# Patient Record
Sex: MALE | Race: WHITE | URBAN - METROPOLITAN AREA
[De-identification: names, ages, dates, MRNs, and addresses within clinical notes are randomized per-mention and may not be internally consistent; named-entity substitution may affect disease eponyms.]

---

## 2023-01-23 ENCOUNTER — OFFICE (OUTPATIENT)
Dept: URBAN - METROPOLITAN AREA CLINIC 104 | Facility: CLINIC | Age: 88
Setting detail: OPHTHALMOLOGY
End: 2023-01-23
Payer: MEDICARE

## 2023-01-23 DIAGNOSIS — H01.005: ICD-10-CM

## 2023-01-23 DIAGNOSIS — Z96.1: ICD-10-CM

## 2023-01-23 DIAGNOSIS — H02.135: ICD-10-CM

## 2023-01-23 DIAGNOSIS — H40.1132: ICD-10-CM

## 2023-01-23 DIAGNOSIS — H01.004: ICD-10-CM

## 2023-01-23 DIAGNOSIS — H35.3132: ICD-10-CM

## 2023-01-23 DIAGNOSIS — H01.001: ICD-10-CM

## 2023-01-23 DIAGNOSIS — H01.002: ICD-10-CM

## 2023-01-23 DIAGNOSIS — H43.813: ICD-10-CM

## 2023-01-23 DIAGNOSIS — H26.493: ICD-10-CM

## 2023-01-23 PROCEDURE — 99213 OFFICE O/P EST LOW 20 MIN: CPT | Performed by: OPHTHALMOLOGY

## 2023-01-23 ASSESSMENT — REFRACTION_AUTOREFRACTION
OS_SPHERE: +0.75
OD_CYLINDER: -2.25
OD_AXIS: 82
OS_CYLINDER: -1.75
OD_SPHERE: +1.50
OS_AXIS: 89

## 2023-01-23 ASSESSMENT — SPHEQUIV_DERIVED
OD_SPHEQUIV: 0.375
OS_SPHEQUIV: -0.125

## 2023-01-23 ASSESSMENT — KERATOMETRY
OD_AXISANGLE_DEGREES: 174
OD_K2POWER_DIOPTERS: 45.42
OD_K1POWER_DIOPTERS: 43.66
OS_K1POWER_DIOPTERS: 44.12
OS_AXISANGLE_DEGREES: 11
OS_K2POWER_DIOPTERS: 45.55

## 2023-01-23 ASSESSMENT — LID EXAM ASSESSMENTS
OD_BLEPHARITIS: RLL RUL 2+ 3+
OS_BLEPHARITIS: LLL LUL 2+ 3+

## 2023-01-23 ASSESSMENT — AXIALLENGTH_DERIVED
OS_AL: 23.16
OD_AL: 23.08

## 2023-01-23 ASSESSMENT — PACHYMETRY
OS_CT_CORRECTION: 0
OS_CT_UM: 540
OD_CT_CORRECTION: 0
OD_CT_UM: 540

## 2023-01-23 ASSESSMENT — VISUAL ACUITY
OS_BCVA: 20/50
OD_BCVA: 20/50

## 2023-01-23 ASSESSMENT — LID POSITION - ECTROPION: OS_ECTROPION: LLL 2+

## 2023-01-23 ASSESSMENT — CONFRONTATIONAL VISUAL FIELD TEST (CVF)
OS_FINDINGS: FULL
OD_FINDINGS: FULL

## 2023-01-23 ASSESSMENT — TONOMETRY
OS_IOP_MMHG: 11
OD_IOP_MMHG: 10

## 2023-07-17 ENCOUNTER — OFFICE (OUTPATIENT)
Dept: URBAN - METROPOLITAN AREA CLINIC 104 | Facility: CLINIC | Age: 88
Setting detail: OPHTHALMOLOGY
End: 2023-07-17
Payer: MEDICARE

## 2023-07-17 DIAGNOSIS — H35.3132: ICD-10-CM

## 2023-07-17 DIAGNOSIS — H02.135: ICD-10-CM

## 2023-07-17 DIAGNOSIS — H40.1132: ICD-10-CM

## 2023-07-17 DIAGNOSIS — H01.002: ICD-10-CM

## 2023-07-17 DIAGNOSIS — H01.001: ICD-10-CM

## 2023-07-17 DIAGNOSIS — H01.004: ICD-10-CM

## 2023-07-17 DIAGNOSIS — H01.005: ICD-10-CM

## 2023-07-17 DIAGNOSIS — H26.493: ICD-10-CM

## 2023-07-17 DIAGNOSIS — H43.813: ICD-10-CM

## 2023-07-17 DIAGNOSIS — Z96.1: ICD-10-CM

## 2023-07-17 PROCEDURE — 99213 OFFICE O/P EST LOW 20 MIN: CPT | Performed by: OPHTHALMOLOGY

## 2023-07-17 ASSESSMENT — VISUAL ACUITY
OS_BCVA: 20/40
OD_BCVA: 20/40

## 2023-07-17 ASSESSMENT — PACHYMETRY
OD_CT_CORRECTION: 0
OD_CT_UM: 540
OS_CT_CORRECTION: 0
OS_CT_UM: 540

## 2023-07-17 ASSESSMENT — LID POSITION - ECTROPION: OS_ECTROPION: LLL 2+

## 2023-07-17 ASSESSMENT — LID EXAM ASSESSMENTS
OS_BLEPHARITIS: LLL LUL 2+ 3+
OD_BLEPHARITIS: RLL RUL 2+ 3+

## 2023-07-17 ASSESSMENT — CONFRONTATIONAL VISUAL FIELD TEST (CVF)
OD_FINDINGS: FULL
OS_FINDINGS: FULL

## 2024-01-22 ENCOUNTER — OFFICE (OUTPATIENT)
Dept: URBAN - METROPOLITAN AREA CLINIC 104 | Facility: CLINIC | Age: 89
Setting detail: OPHTHALMOLOGY
End: 2024-01-22
Payer: MEDICARE

## 2024-01-22 DIAGNOSIS — H01.004: ICD-10-CM

## 2024-01-22 DIAGNOSIS — H35.3132: ICD-10-CM

## 2024-01-22 DIAGNOSIS — Z96.1: ICD-10-CM

## 2024-01-22 DIAGNOSIS — H26.493: ICD-10-CM

## 2024-01-22 DIAGNOSIS — H43.813: ICD-10-CM

## 2024-01-22 DIAGNOSIS — H52.4: ICD-10-CM

## 2024-01-22 DIAGNOSIS — H01.001: ICD-10-CM

## 2024-01-22 DIAGNOSIS — H40.1132: ICD-10-CM

## 2024-01-22 DIAGNOSIS — H02.135: ICD-10-CM

## 2024-01-22 DIAGNOSIS — H01.002: ICD-10-CM

## 2024-01-22 DIAGNOSIS — H01.005: ICD-10-CM

## 2024-01-22 PROCEDURE — 99213 OFFICE O/P EST LOW 20 MIN: CPT | Performed by: OPHTHALMOLOGY

## 2024-01-22 PROCEDURE — 92133 CPTRZD OPH DX IMG PST SGM ON: CPT | Performed by: OPHTHALMOLOGY

## 2024-01-22 PROCEDURE — 92015 DETERMINE REFRACTIVE STATE: CPT | Performed by: OPHTHALMOLOGY

## 2024-01-22 ASSESSMENT — REFRACTION_MANIFEST
OS_VA1: 20/30-
OS_VA2: 20/30
OD_VA2: 20/30
OS_CYLINDER: -1.25
OD_SPHERE: +0.50
OS_SPHERE: PLANO
OD_ADD: +2.50
OS_ADD: +2.50
OD_AXIS: 090
OD_VA1: 20/30
OD_CYLINDER: -1.00
OS_AXIS: 090

## 2024-01-22 ASSESSMENT — REFRACTION_AUTOREFRACTION
OD_CYLINDER: -2.75
OS_SPHERE: +0.75
OS_CYLINDER: -2.25
OD_SPHERE: +1.50
OD_AXIS: 088
OS_AXIS: 088

## 2024-01-22 ASSESSMENT — CONFRONTATIONAL VISUAL FIELD TEST (CVF)
OD_FINDINGS: FULL
OS_FINDINGS: FULL

## 2024-01-22 ASSESSMENT — LID POSITION - ECTROPION: OS_ECTROPION: LLL 2+

## 2024-01-22 ASSESSMENT — SPHEQUIV_DERIVED
OD_SPHEQUIV: 0
OS_SPHEQUIV: -0.375
OD_SPHEQUIV: 0.125

## 2024-01-22 ASSESSMENT — LID EXAM ASSESSMENTS
OS_BLEPHARITIS: LLL LUL 2+ 3+
OD_BLEPHARITIS: RLL RUL 2+ 3+

## 2024-01-29 ENCOUNTER — EMERGENCY (EMERGENCY)
Facility: HOSPITAL | Age: 89
LOS: 1 days | Discharge: HOME | End: 2024-01-29
Attending: EMERGENCY MEDICINE
Payer: MEDICARE

## 2024-01-29 VITALS
OXYGEN SATURATION: 93 % | RESPIRATION RATE: 16 BRPM | SYSTOLIC BLOOD PRESSURE: 165 MMHG | TEMPERATURE: 98 F | HEART RATE: 62 BPM | DIASTOLIC BLOOD PRESSURE: 77 MMHG

## 2024-01-29 VITALS
OXYGEN SATURATION: 92 % | HEART RATE: 61 BPM | TEMPERATURE: 98 F | SYSTOLIC BLOOD PRESSURE: 170 MMHG | HEIGHT: 69 IN | RESPIRATION RATE: 20 BRPM | WEIGHT: 154.98 LBS | DIASTOLIC BLOOD PRESSURE: 70 MMHG

## 2024-01-29 PROCEDURE — 72125 CT NECK SPINE W/O DYE: CPT | Mod: 26,MA

## 2024-01-29 PROCEDURE — 70450 CT HEAD/BRAIN W/O DYE: CPT | Mod: 26,MA

## 2024-01-29 PROCEDURE — 93010 ELECTROCARDIOGRAM REPORT: CPT

## 2024-01-29 PROCEDURE — 72125 CT NECK SPINE W/O DYE: CPT | Mod: MA

## 2024-01-29 PROCEDURE — G1004: CPT

## 2024-01-29 PROCEDURE — 71250 CT THORAX DX C-: CPT | Mod: 26,ME

## 2024-01-29 PROCEDURE — 99284 EMERGENCY DEPT VISIT MOD MDM: CPT

## 2024-01-29 PROCEDURE — 99284 EMERGENCY DEPT VISIT MOD MDM: CPT | Mod: 25

## 2024-01-29 PROCEDURE — 74176 CT ABD & PELVIS W/O CONTRAST: CPT | Mod: ME

## 2024-01-29 PROCEDURE — 74176 CT ABD & PELVIS W/O CONTRAST: CPT | Mod: 26,ME

## 2024-01-29 PROCEDURE — 71250 CT THORAX DX C-: CPT | Mod: ME

## 2024-01-29 PROCEDURE — 70450 CT HEAD/BRAIN W/O DYE: CPT | Mod: MA

## 2024-01-29 PROCEDURE — 93005 ELECTROCARDIOGRAM TRACING: CPT

## 2024-01-29 RX ORDER — METHOCARBAMOL 500 MG/1
1000 TABLET, FILM COATED ORAL ONCE
Refills: 0 | Status: COMPLETED | OUTPATIENT
Start: 2024-01-29 | End: 2024-01-29

## 2024-01-29 RX ORDER — LIDOCAINE 4 G/100G
1 CREAM TOPICAL ONCE
Refills: 0 | Status: COMPLETED | OUTPATIENT
Start: 2024-01-29 | End: 2024-01-29

## 2024-01-29 RX ADMIN — LIDOCAINE 1 PATCH: 4 CREAM TOPICAL at 13:48

## 2024-01-29 RX ADMIN — METHOCARBAMOL 1000 MILLIGRAM(S): 500 TABLET, FILM COATED ORAL at 13:49

## 2024-01-29 NOTE — ED PROVIDER NOTE - CLINICAL SUMMARY MEDICAL DECISION MAKING FREE TEXT BOX
89-year-old male history of  CAD status post stent and pacemaker on Xarelto brought in by EMS for unwitnessed fall yesterday.  Patient reports that he was watching a game on TV while sitting in his wheelchair and slipped out of his wheelchair.  Patient denies any loss of consciousness.  He reported hitting the right lower rib on the back of the wheelchair when he slipped down.  Patient report that he is able to ambulate but pain with movement.  Patient was sent in for evaluation for possible fractures.  Patient received Tylenol around 930 for pain.       Given patient had unwitnessed fall on Xarelto will get CT head and cervical spine to rule out intracranial bleed, patient has small ecchymosis to the right lower rib posteriorly and the right CVA area will get CT chest and abdomen to rule out fracture and internal bleeding. 89-year-old male history of  CAD status post stent and pacemaker on Xarelto brought in by EMS for unwitnessed fall yesterday.  Patient reports that he was watching a game on TV while sitting in his wheelchair and slipped out of his wheelchair.  Patient denies any loss of consciousness.  He reported hitting the right lower rib on the back of the wheelchair when he slipped down.  Patient report that he is able to ambulate but pain with movement.  Patient was sent in for evaluation for possible fractures.  Patient received Tylenol around 930 for pain.       Given patient had unwitnessed fall on Xarelto will get CT head and cervical spine to rule out intracranial bleed, patient has small ecchymosis to the right lower rib posteriorly and the right CVA area will get CT chest and abdomen to rule out fracture and internal bleeding.    CT scan showed nondisplaced right rib fracture.  Pain controlled.  No hypoxia.  Spoke to daughter who is comfortable having patient back at assisted living.  Transportation arranged.

## 2024-01-29 NOTE — ED PROVIDER NOTE - OBJECTIVE STATEMENT
89-year-old male history of  CAD status post stent and pacemaker on Xarelto brought in by EMS for unwitnessed fall yesterday.  Patient reports that he was watching a game on TV while sitting in his wheelchair and slipped out of his wheelchair.  Patient denies any loss of consciousness.  He reported hitting the right lower rib on the back of the wheelchair when he slipped down.  Patient report that he is able to ambulate but pain with movement.  Patient was sent in for evaluation for possible fractures.  Patient received Tylenol around 930 for pain.

## 2024-01-29 NOTE — ED ADULT NURSE NOTE - NSFALLRISKINTERV_ED_ALL_ED

## 2024-01-29 NOTE — ED PROVIDER NOTE - PATIENT PORTAL LINK FT
You can access the FollowMyHealth Patient Portal offered by [facility] by registering at the following website: [email or web address]. By joining [employer]’s FollowMyHealth portal, you will also be able to view your health information using other applications (apps) compatible with our system. Poor

## 2024-01-29 NOTE — ED ADULT TRIAGE NOTE - CHIEF COMPLAINT QUOTE
BIAB from home s/p sliding form the chair and  scraping R  sided back on the chair yesterday- denies head injury or LOC .

## 2024-01-29 NOTE — ED PROVIDER NOTE - NS ED ROS FT
CONSTITUTIONAL - no  fever, no diaphoresis, no weight change  SKIN - no rash  HEMATOLOGIC - no bleeding, no bruising  EYES - no eye pain, no blurred vision  ENT - no change in hearing, no pain  RESPIRATORY - no shortness of breath, no cough  CARDIAC - no chest pain, no palpitations (+) right lower posterior rib pain.  GI - (+) right CVA tenderness.  no nausea, no vomiting, no diarrhea, no constipation, no bleeding  GENITO-URINARY - no discharge, no dysuria; no hematuria,   ENDO - no polydipsia, no polyuria, no heat/no cold intolerance  MUSCULOSKELETAL - no joint pain, no swelling, no redness  NEUROLOGIC - no weakness, no headache, no anesthesia, no paresthesias  PSYCH - no anxiety, non suicidal, non homicidal, no hallucination, no depression

## 2024-01-29 NOTE — ED PROVIDER NOTE - NSFOLLOWUPINSTRUCTIONS_ED_ALL_ED_FT
Please take tylenol 650 mg by mouth 3 times daily with food for pain   Please apply  lidocain patch (any dose) daily for rib pain.      Rib Fracture    A rib fracture is a break or crack in one of the bones of the ribs. The ribs are long, curved bones that wrap around your chest and attach to your spine and your breastbone. The ribs protect your heart, lungs, and other organs in the chest.    A broken or cracked rib is often painful but is not usually serious. Most rib fractures heal on their own over time. However, rib fractures can be more serious if multiple ribs are broken or if broken ribs move out of place and push against other structures or organs.    What are the causes?  This condition is caused by:  Repetitive movements with high force, such as pitching a baseball or having very bad coughing spells.  A direct hit the chest, such as a sports injury, a car crash, or a fall.  Cancer that has spread to the bones, which can weaken bones and cause them to break.  What are the signs or symptoms?  Symptoms of this condition include:  Pain when you breathe in or cough.  Pain when someone presses on the injured area.  Feeling short of breath.  How is this diagnosed?  This condition is diagnosed with a physical exam and medical history. You may also have imaging tests, such as:  Chest X-ray.  CT scan.  MRI.  Bone scan.  Chest ultrasound.  How is this treated?  Treatment for this condition depends on the severity of the fracture. Most rib fractures usually heal on their own in 1–3 months. Healing may take longer if you have a cough or if you do activities that make the injury worse. While you heal, you may be given medicines to control the pain. You will also be taught deep breathing exercises.    Severe injuries may require hospitalization or surgery.    Follow these instructions at home:  Managing pain, stiffness, and swelling    If directed, put ice on the injured area. To do this:  Put ice in a plastic bag.  Place a towel between your skin and the bag.  Leave the ice on for 20 minutes, 2–3 times a day.  Remove the ice if your skin turns bright red. This is very important. If you cannot feel pain, heat, or cold, you have a greater risk of damage to the area.  Take over-the-counter and prescription medicines only as told by your health care provider.  Activity    Avoid doing activities or movements that cause pain. Be careful during activities and avoid bumping the injured rib.  Slowly increase your activity as told by your health care provider.  General instructions    Do deep breathing exercises as told by your health care provider. This helps prevent pneumonia, which is a common complication of a broken rib. Your health care provider may instruct you to:  Take deep breaths several times a day.  Try to cough several times a day, holding a pillow against the injured area.  Use a device called incentive spirometer to practice deep breathing several times a day.  Drink enough fluid to keep your urine pale yellow.  Do not wear a rib belt or binder. These restrict breathing, which can lead to pneumonia.  Keep all follow-up visits. This is important.  Contact a health care provider if:  You have a fever.  Get help right away if:  You have difficulty breathing or you are short of breath.  You develop a cough that does not stop, or you cough up thick or bloody sputum.  You have nausea, vomiting, or pain in your abdomen.  Your pain gets worse and medicine does not help.  These symptoms may represent a serious problem that is an emergency. Do not wait to see if the symptoms will go away. Get medical help right away. Call your local emergency services (911 in the [place] Do not drive yourself to the hospital.    Summary  A rib fracture is a break or crack in one of the bones of the ribs.  A broken or cracked rib is often painful but is not usually serious.  Most rib fractures heal on their own over time.  Treatment for this condition depends on the severity of the fracture.  Avoid doing any activities or movements that cause pain.  This information is not intended to replace advice given to you by your health care provider. Make sure you discuss any questions you have with your health care provider.

## 2024-01-29 NOTE — ED PROVIDER NOTE - PHYSICAL EXAMINATION
VITAL SIGNS: I have reviewed nursing notes and confirm.  CONSTITUTIONAL:  in no acute distress.  SKIN: Skin exam is warm and dry, no acute rash.  HEAD: Normocephalic; atraumatic.  EYES: PERRL, EOM intact; conjunctiva and sclera clear.  ENT: No nasal discharge; airway clear. Throat clear.  NECK: Supple; non tender.    CARD: Regular rate and rhythm.  RESP: No wheezes,  no rales or rhonchi.   ABD:  soft; non-distended; (+) 2 cm x 2 cm ecchymosis and point tendernes to right lower rib and right CVA area.   EXT: Normal ROM. No clubbing, cyanosis or edema.  NEURO: Alert, oriented. Grossly unremarkable. No focal deficits.  moves all extremities,    PSYCH: Cooperative, appropriate.

## 2024-03-18 ENCOUNTER — EMERGENCY (EMERGENCY)
Facility: HOSPITAL | Age: 89
LOS: 1 days | Discharge: HOME | End: 2024-03-18
Attending: EMERGENCY MEDICINE
Payer: MEDICARE

## 2024-03-18 VITALS
DIASTOLIC BLOOD PRESSURE: 81 MMHG | RESPIRATION RATE: 18 BRPM | TEMPERATURE: 98 F | OXYGEN SATURATION: 94 % | HEART RATE: 65 BPM | SYSTOLIC BLOOD PRESSURE: 151 MMHG

## 2024-03-18 VITALS
WEIGHT: 173.94 LBS | HEART RATE: 66 BPM | RESPIRATION RATE: 16 BRPM | OXYGEN SATURATION: 94 % | HEIGHT: 69 IN | DIASTOLIC BLOOD PRESSURE: 79 MMHG | SYSTOLIC BLOOD PRESSURE: 157 MMHG | TEMPERATURE: 97 F

## 2024-03-18 PROCEDURE — 90471 IMMUNIZATION ADMIN: CPT

## 2024-03-18 PROCEDURE — 70450 CT HEAD/BRAIN W/O DYE: CPT | Mod: 26,MC

## 2024-03-18 PROCEDURE — 73110 X-RAY EXAM OF WRIST: CPT | Mod: 26,RT

## 2024-03-18 PROCEDURE — 94640 AIRWAY INHALATION TREATMENT: CPT

## 2024-03-18 PROCEDURE — 72125 CT NECK SPINE W/O DYE: CPT | Mod: MC

## 2024-03-18 PROCEDURE — 99284 EMERGENCY DEPT VISIT MOD MDM: CPT

## 2024-03-18 PROCEDURE — 90715 TDAP VACCINE 7 YRS/> IM: CPT

## 2024-03-18 PROCEDURE — 73090 X-RAY EXAM OF FOREARM: CPT

## 2024-03-18 PROCEDURE — 72125 CT NECK SPINE W/O DYE: CPT | Mod: 26,MC

## 2024-03-18 PROCEDURE — 70450 CT HEAD/BRAIN W/O DYE: CPT | Mod: MC

## 2024-03-18 PROCEDURE — 73110 X-RAY EXAM OF WRIST: CPT

## 2024-03-18 PROCEDURE — 71045 X-RAY EXAM CHEST 1 VIEW: CPT | Mod: 26

## 2024-03-18 PROCEDURE — 71045 X-RAY EXAM CHEST 1 VIEW: CPT

## 2024-03-18 PROCEDURE — 99284 EMERGENCY DEPT VISIT MOD MDM: CPT | Mod: 25

## 2024-03-18 PROCEDURE — 73090 X-RAY EXAM OF FOREARM: CPT | Mod: 26,LT

## 2024-03-18 RX ORDER — ALBUTEROL 90 UG/1
2 AEROSOL, METERED ORAL ONCE
Refills: 0 | Status: COMPLETED | OUTPATIENT
Start: 2024-03-18 | End: 2024-03-18

## 2024-03-18 RX ORDER — ACETAMINOPHEN 500 MG
650 TABLET ORAL ONCE
Refills: 0 | Status: DISCONTINUED | OUTPATIENT
Start: 2024-03-18 | End: 2024-03-26

## 2024-03-18 RX ORDER — ACETAMINOPHEN 500 MG
650 TABLET ORAL ONCE
Refills: 0 | Status: COMPLETED | OUTPATIENT
Start: 2024-03-18 | End: 2024-03-18

## 2024-03-18 RX ORDER — ALBUTEROL 90 UG/1
2 AEROSOL, METERED ORAL
Qty: 1 | Refills: 0
Start: 2024-03-18 | End: 2024-03-20

## 2024-03-18 RX ORDER — TETANUS TOXOID, REDUCED DIPHTHERIA TOXOID AND ACELLULAR PERTUSSIS VACCINE, ADSORBED 5; 2.5; 8; 8; 2.5 [IU]/.5ML; [IU]/.5ML; UG/.5ML; UG/.5ML; UG/.5ML
0.5 SUSPENSION INTRAMUSCULAR ONCE
Refills: 0 | Status: COMPLETED | OUTPATIENT
Start: 2024-03-18 | End: 2024-03-18

## 2024-03-18 RX ADMIN — ALBUTEROL 2 PUFF(S): 90 AEROSOL, METERED ORAL at 16:16

## 2024-03-18 RX ADMIN — TETANUS TOXOID, REDUCED DIPHTHERIA TOXOID AND ACELLULAR PERTUSSIS VACCINE, ADSORBED 0.5 MILLILITER(S): 5; 2.5; 8; 8; 2.5 SUSPENSION INTRAMUSCULAR at 16:16

## 2024-03-18 RX ADMIN — Medication 100 MILLIGRAM(S): at 16:15

## 2024-03-18 RX ADMIN — Medication 650 MILLIGRAM(S): at 14:00

## 2024-03-18 NOTE — ED ADULT NURSE NOTE - NSFALLHARMRISKINTERV_ED_ALL_ED

## 2024-03-18 NOTE — ED PROVIDER NOTE - PHYSICAL EXAMINATION
Gen: NAD, AOx3  Head:  contusion present to right cheek  HEENT: oral mucosa moist, normal conjunctiva, oropharynx clear without exudate or erythema  Lung: CTAB, no respiratory distress, no wheezing, rales, rhonchi  CV: normal s1/s2, rrr, no murmurs, Normal perfusion, pulses 2+ throughout  Abd: soft, NTND  MSK: No edema, no visible deformities, full range of motion in all 4 extremities  Neuro:  no midline C/T/L-spine tenderness,CN II-XII grossly intact, No focal neurologic deficits  Skin: 10 cm U-shaped laceration/skin tear present to right forearm, not actively bleeding, additional 1 cm laceration present to dorsal aspect of third digit on left, not actively bleeding  Psych: normal affect

## 2024-03-18 NOTE — ED PROVIDER NOTE - CLINICAL SUMMARY MEDICAL DECISION MAKING FREE TEXT BOX
84-year-old male presenting with skin tear to right forearm, as well as head injury.  CT head and C-spine reviewed, no traumatic injuries noted.  X-ray of right forearm obtained, no fracture.  The skin tear was replaced with Steri-Strips, with good approximation, and covered with Ace bandage.  The skin tear to the left digit was repaired with a Band-Aid.  of note, patient has been complaining of cough over the past few days.  Denies any shortness of breath, chest pain, noticed wheezing.  Patient is a former smoker, prior CAT scan reviewed which shows signs of emphysema. Chest x-ray   Today was reviewed, does not appear to be pneumonia.  plan to give albuterol to treat for possible COPD, as well as doxycycline to treat for atypical pneumonia and coverage of wound to right arm, I discussed the plan with patient's assisted living and patient's daughter at bedside who agree with discharge.

## 2024-03-18 NOTE — ED ADULT NURSE NOTE - OBJECTIVE STATEMENT
Assumed care of patient. Pt a&ox4 rr even and unlabored presents to ed s/p mechanical fall from standing position after using the toilet. Pt reports falling forward with head strike and pain to RUE. Reports on xarelto. Noted small abrasion on forehead and R forearm (Dr. Jensen laceration repair). Denies chest pain, sob, fever, chills, gu/gi symptoms. pt educated on plan of care, pt able to successfully teach back plan of care to RN, RN will continue to reeducate pt during hospital stay.

## 2024-03-18 NOTE — ED ADULT TRIAGE NOTE - CHIEF COMPLAINT QUOTE
A&O x 4 Eliquis from assisted living BIBA for headstrike on fall from standing position; "I was on the toilet and fell trying to put my pants on." To CT.

## 2024-03-18 NOTE — ED PROVIDER NOTE - PATIENT PORTAL LINK FT
You can access the FollowMyHealth Patient Portal offered by [facility] by registering at the following website: [email or web address]. By joining [employer]’s FollowMyHealth portal, you will also be able to view your health information using other applications (apps) compatible with our system.

## 2024-03-18 NOTE — ED PROVIDER NOTE - NSFOLLOWUPINSTRUCTIONS_ED_ALL_ED_FT
1. Follow up with your primary care physician within 2-3days for reevaluation.  2.  Return to the Emergency Department immediately for any worsening, progressive or concerning symptoms.     Laceration    WHAT YOU NEED TO KNOW:    A laceration is an injury to the skin and the soft tissue underneath it. Lacerations happen when you are cut or hit by something. They can happen anywhere on the body.     DISCHARGE INSTRUCTIONS:    Return to the emergency department if:     You have heavy bleeding or bleeding that does not stop after 10 minutes of holding firm, direct pressure over the wound.       Your wound opens up.     Contact your healthcare provider if:     You have a fever or chills.       Your laceration is red, warm, or swollen.      You have red streaks on your skin coming from your wound.      You have white or yellow drainage from the wound that smells bad.      You have pain that gets worse, even after treatment.       You have questions or concerns about your condition or care.     Medicines:     Prescription pain medicine may be given. Ask how to take this medicine safely.       Antibiotics help treat or prevent a bacterial infection.       Take your medicine as directed. Contact your healthcare provider if you think your medicine is not helping or if you have side effects. Tell him or her if you are allergic to any medicine. Keep a list of the medicines, vitamins, and herbs you take. Include the amounts, and when and why you take them. Bring the list or the pill bottles to follow-up visits. Carry your medicine list with you in case of an emergency.    Care for your wound as directed:     Do not get your wound wet until your healthcare provider says it is okay. Do not soak your wound in water. Do not go swimming until your healthcare provider says it is okay. Carefully wash the wound with soap and water. Gently pat the area dry or allow it to air dry.       Change your bandages when they get wet, dirty, or after washing. Apply new, clean bandages as directed. Do not apply elastic bandages or tape too tight. Do not put powders or lotions over your incision.       Apply antibiotic ointment as directed. Your healthcare provider may give you antibiotic ointment to put over your wound if you have stitches. If you have strips of tape over your incision, let them dry up and fall off on their own. If they do not fall off within 14 days, gently remove them. If you have glue over your wound, do not remove or pick at it. If your glue comes off, do not replace it with glue that you have at home.       Check your wound every day for signs of infection such as swelling, redness, or pus.     Self-care:     Apply ice on your wound for 15 to 20 minutes every hour or as directed. Use an ice pack, or put crushed ice in a plastic bag. Cover it with a towel. Ice helps prevent tissue damage and decreases swelling and pain.      Use a splint as directed. A splint will decrease movement and stress on your wound. It may help it heal faster. A splint may be used for lacerations over joints or areas of your body that bend. Ask your healthcare provider how to apply and remove a splint.       Decrease scarring of your wound by applying ointments as directed. Do not apply ointments until your healthcare provider says it is okay. You may need to wait until your wound is healed. Ask which ointment to buy and how often to use it. After your wound is healed, use sunscreen over the area when you are out in the sun. You should do this for at least 6 months to 1 year after your injury.     Follow up with your healthcare provider as directed: You may need to follow up in 24 to 48 hours to have your wound checked for infection. You will need to return in 3 to 14 days if you have stitches or staples so they can be removed. Care for your wound as directed to prevent infection and help it heal. Write down your questions so you remember to ask them during your visits.

## 2024-03-18 NOTE — ED PROVIDER NOTE - OBJECTIVE STATEMENT
89yo male Presenting with laceration to right arm as well as head injury status post mechanical fall from standing.  Patient states that he was trying to put his pants on after going to the bathroom, lost his balance and fell.  Denies any LOC.  Unknown last tetanus shot.

## 2024-03-18 NOTE — ED PROVIDER NOTE - CARE PLAN
1 Principal Discharge DX:	Skin tear of right forearm without complication, initial encounter  Secondary Diagnosis:	Closed head injury  Secondary Diagnosis:	Viral URI with cough

## 2024-07-22 ENCOUNTER — OFFICE (OUTPATIENT)
Dept: URBAN - METROPOLITAN AREA CLINIC 104 | Facility: CLINIC | Age: 89
Setting detail: OPHTHALMOLOGY
End: 2024-07-22
Payer: MEDICARE

## 2024-07-22 DIAGNOSIS — H01.005: ICD-10-CM

## 2024-07-22 DIAGNOSIS — H01.004: ICD-10-CM

## 2024-07-22 DIAGNOSIS — H02.135: ICD-10-CM

## 2024-07-22 DIAGNOSIS — H35.3132: ICD-10-CM

## 2024-07-22 DIAGNOSIS — H43.813: ICD-10-CM

## 2024-07-22 DIAGNOSIS — H01.002: ICD-10-CM

## 2024-07-22 DIAGNOSIS — Z96.1: ICD-10-CM

## 2024-07-22 DIAGNOSIS — H26.493: ICD-10-CM

## 2024-07-22 DIAGNOSIS — H40.1132: ICD-10-CM

## 2024-07-22 DIAGNOSIS — H01.001: ICD-10-CM

## 2024-07-22 PROCEDURE — 99213 OFFICE O/P EST LOW 20 MIN: CPT | Performed by: OPHTHALMOLOGY

## 2024-07-22 ASSESSMENT — CONFRONTATIONAL VISUAL FIELD TEST (CVF)
OD_FINDINGS: FULL
OS_FINDINGS: FULL

## 2024-07-22 ASSESSMENT — LID POSITION - ECTROPION: OS_ECTROPION: LLL 2+

## 2024-07-22 ASSESSMENT — LID EXAM ASSESSMENTS
OD_BLEPHARITIS: RLL RUL 2+ 3+
OS_BLEPHARITIS: LLL LUL 2+ 3+